# Patient Record
Sex: FEMALE | Race: WHITE | NOT HISPANIC OR LATINO | Employment: UNEMPLOYED | URBAN - METROPOLITAN AREA
[De-identification: names, ages, dates, MRNs, and addresses within clinical notes are randomized per-mention and may not be internally consistent; named-entity substitution may affect disease eponyms.]

---
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
## 2025-03-11 DIAGNOSIS — E25.0 NONCLASSIC CONGENITAL ADRENAL HYPERPLASIA DUE TO 21-HYDROXYLASE DEFICIENCY (MULTI): ICD-10-CM

## 2025-03-12 RX ORDER — SPIRONOLACTONE 100 MG/1
100 TABLET, FILM COATED ORAL 2 TIMES DAILY
Qty: 180 TABLET | Refills: 1 | Status: SHIPPED | OUTPATIENT
Start: 2025-03-12 | End: 2026-03-12

## 2025-03-12 RX ORDER — NORGESTIMATE AND ETHINYL ESTRADIOL 0.25-0.035
KIT ORAL
Qty: 112 TABLET | Refills: 3 | Status: SHIPPED | OUTPATIENT
Start: 2025-03-12

## 2025-06-06 ENCOUNTER — TELEPHONE (OUTPATIENT)
Dept: PEDIATRICS | Facility: CLINIC | Age: 20
End: 2025-06-06
Payer: COMMERCIAL

## 2025-06-06 DIAGNOSIS — Z11.1 SCREENING FOR TUBERCULOSIS: Primary | ICD-10-CM

## 2025-06-06 NOTE — TELEPHONE ENCOUNTER
Mother stated that Pt is needing a tdap test to shadow at the hospital. I wasn't sure if I should schedule since ifs been a year since she was here for a nurse visit. Mothers phone number is [phone]

## 2025-06-06 NOTE — TELEPHONE ENCOUNTER
Spoke with mother on the phone. Georgette will be shadowing at [facility] this summer. Needs a TB screen to start. Will order t-spot.

## 2025-06-09 LAB
IGNF NEG CNTRL BLD: NORMAL
M TB IFN-G BLD-IMP: NEGATIVE
MITOGEN IGNF.SPOT COUNT BLD: NORMAL
QUEST PANEL A SPOT COUNT: 1
QUEST PANEL B SPOT COUNT: 0

## 2025-06-16 ENCOUNTER — APPOINTMENT (OUTPATIENT)
Dept: PEDIATRIC ENDOCRINOLOGY | Facility: CLINIC | Age: 20
End: 2025-06-16
Payer: COMMERCIAL

## 2025-06-16 VITALS
HEIGHT: 59 IN | SYSTOLIC BLOOD PRESSURE: 105 MMHG | WEIGHT: 95.46 LBS | HEART RATE: 73 BPM | BODY MASS INDEX: 19.24 KG/M2 | DIASTOLIC BLOOD PRESSURE: 69 MMHG

## 2025-06-16 DIAGNOSIS — E25.0 NONCLASSIC CONGENITAL ADRENAL HYPERPLASIA DUE TO 21-HYDROXYLASE DEFICIENCY (MULTI): Primary | ICD-10-CM

## 2025-06-16 PROCEDURE — 3008F BODY MASS INDEX DOCD: CPT | Performed by: PEDIATRICS

## 2025-06-16 PROCEDURE — 99215 OFFICE O/P EST HI 40 MIN: CPT | Performed by: PEDIATRICS

## 2025-06-16 RX ORDER — ESTRADIOL 2 MG/1
TABLET ORAL
Qty: 21 TABLET | Refills: 3 | Status: SHIPPED | OUTPATIENT
Start: 2025-06-16

## 2025-06-16 RX ORDER — SPIRONOLACTONE 100 MG/1
100 TABLET, FILM COATED ORAL 2 TIMES DAILY
Qty: 180 TABLET | Refills: 3 | Status: SHIPPED | OUTPATIENT
Start: 2025-06-16 | End: 2026-06-16

## 2025-06-16 NOTE — PROGRESS NOTES
Subjective   Georgette Taylor is a 19 y.o. female who presents for Nonclassic congenital adrenal hyperplasia due to 21-hydroxy    Last visit was a year ago. CAH panel consistent with nonclassic CAH with adequate cortisol response. On OCP and spironolactone    Sprintec in the evening - and bhumika 100 mg BID:    Never missing a dose    Androgen control - excellent no acne or hair development.    Still some light headedness intermittently. 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     No nausaea or vomiting.     No excessive urination.    Weight is down 5 pounds.    Going to [facility]: end of July.     Spotting happening intermittently with a couple days duration.     Last spotting was a month ago. Was happening continuously even before going to college. Starting senior year. No other medications or health issues. Recent diagnosis of BV/yeast infection.      Current Outpatient Medications:   ·  cetirizine (ZyrTEC) 10 mg tablet, Take 1 tablet (10 mg) by mouth once daily., Disp: , Rfl:   ·  fluticasone (Flonase) 50 mcg/actuation nasal spray, , Disp: , Rfl:   ·  norgestimate-ethinyl estradiol (Sprintec, 28,) 0.25-35 mg-mcg tablet, Take 1 pill daily, skipping placebo pills and take continuously. Please dispense 4 packs every 3 months, allowing one withdrawal bleed every 3-6 months., Disp: 112 tablet, Rfl: 3  ·  triamcinolone (Kenalog) 0.1 % cream, Apply topically 2 times a day. IN A THIN FILM TO AFFECTED AREAS. (AM AND PM)., Disp: 80 g, Rfl: 1  ·  HYDROcodone-acetaminophen (Norco) 5-325 mg tablet, Take 1 tablet by mouth every 4 hours if needed. (Patient not taking: Reported on 6/16/2025), Disp: , Rfl:   ·  ketotifen (Zaditor) 0.025 % (0.035 %) ophthalmic solution, Administer into affected eye(s) every 12 hours. (Patient not taking: Reported on 6/16/2025), Disp: , Rfl:   ·  spironolactone (Aldactone) 100 mg tablet, Take 1 tablet (100 mg) by mouth 2 times a day., Disp: 180 tablet, Rfl:  "3            Current Outpatient Medications:   ·  cetirizine (ZyrTEC) 10 mg tablet, Take 1 tablet (10 mg) by mouth once daily., Disp: , Rfl:   ·  fluticasone (Flonase) 50 mcg/actuation nasal spray, , Disp: , Rfl:   ·  norgestimate-ethinyl estradiol (Sprintec, 28,) 0.25-35 mg-mcg tablet, Take 1 pill daily, skipping placebo pills and take continuously. Please dispense 4 packs every 3 months, allowing one withdrawal bleed every 3-6 months., Disp: 112 tablet, Rfl: 3  ·  spironolactone (Aldactone) 100 mg tablet, Take 1 tablet (100 mg) by mouth 2 times a day., Disp: 180 tablet, Rfl: 1  ·  triamcinolone (Kenalog) 0.1 % cream, Apply topically 2 times a day. IN A THIN FILM TO AFFECTED AREAS. (AM AND PM)., Disp: 80 g, Rfl: 1  ·  HYDROcodone-acetaminophen (Norco) 5-325 mg tablet, Take 1 tablet by mouth every 4 hours if needed. (Patient not taking: Reported on 6/16/2025), Disp: , Rfl:   ·  ketotifen (Zaditor) 0.025 % (0.035 %) ophthalmic solution, Administer into affected eye(s) every 12 hours. (Patient not taking: Reported on 6/16/2025), Disp: , Rfl:           Review of Systems   All other systems reviewed and are negative.       Objective   /69 (BP Location: Right arm, Patient Position: Sitting)   Pulse 73   Ht 1.494 m (4' 10.82\")   Wt (!) 43.3 kg (95 lb 7.4 oz)   BMI 19.40 kg/m²   Growth Velocity: 0.161 cm/yr using Stature 1.494 m recorded 6/16/2025 and Stature 1.492 m recorded 3/19/2024    Physical Exam  Constitutional:       Appearance: Normal appearance.   HENT:      Head: Normocephalic and atraumatic.      Nose: Nose normal.      Mouth/Throat:      Mouth: Mucous membranes are moist.      Pharynx: Oropharynx is clear.   Eyes:      Extraocular Movements: Extraocular movements intact.      Conjunctiva/sclera: Conjunctivae normal.   Cardiovascular:      Rate and Rhythm: Normal rate and regular rhythm.      Pulses: Normal pulses.      Heart sounds: Normal heart sounds.   Pulmonary:      Effort: Pulmonary effort is " normal.      Breath sounds: Normal breath sounds.   Abdominal:      General: Abdomen is flat. Bowel sounds are normal.      Palpations: Abdomen is soft.   Musculoskeletal:         General: Normal range of motion.      Cervical back: Normal range of motion and neck supple.   Skin:     General: Skin is warm and dry.   Neurological:      General: No focal deficit present.      Mental Status: She is alert. Mental status is at baseline.   Psychiatric:         Mood and Affect: Mood normal.         Behavior: Behavior normal.         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 No prior pelvic imaging obtained, will recommend a baseline pelvic US to rule out endometrial hyperplasia. However, she has allowed cycles to occur a couple times a year so this would be somewhat surprising. Assuming US is reassuring, would reocmmend a trial of oral estrace x 7 days up to once per month if breakthrough bleeding occurs. Due for labs (ordered today). Meds refilled. FU in 1 year. Suggested Dr. Phyllis Gleason (med peds) as a future transition option and non urgent consideration of referral to reproductive endocrinology.

## 2025-06-16 NOTE — PATIENT INSTRUCTIONS
Recommendations:    Refilled spironolactone and Sprintec. Sprintec already has an adequate amount of estrogen.   For breakthrough bleeding, can use estrace 2 mg daily x 7 days for breakthrough bleeding once per month (prescribed today). Only use if bleeding is occurring.  Recommend a PELVIC ULTRASOUND to rule out other causes of bleeding.  Dr. Phyllis Gleason for med-peds endocrinology in the future (I'm happy to see you for another couple years).  Non-urgent appointment with reproductive endocrinology to discuss fertility options in the future. I will place this referral today.  Labs to be done in the morning optimally.  Follow-up 1 year.

## 2025-06-26 ENCOUNTER — APPOINTMENT (OUTPATIENT)
Dept: RADIOLOGY | Facility: HOSPITAL | Age: 20
End: 2025-06-26
Payer: COMMERCIAL

## 2025-06-26 DIAGNOSIS — E25.0 NONCLASSIC CONGENITAL ADRENAL HYPERPLASIA DUE TO 21-HYDROXYLASE DEFICIENCY (MULTI): ICD-10-CM

## 2025-06-26 PROCEDURE — 76830 TRANSVAGINAL US NON-OB: CPT

## 2025-07-08 DIAGNOSIS — N84.0 ENDOMETRIAL POLYP: Primary | ICD-10-CM

## 2025-07-08 DIAGNOSIS — E25.0 NONCLASSIC CONGENITAL ADRENAL HYPERPLASIA DUE TO 21-HYDROXYLASE DEFICIENCY (MULTI): ICD-10-CM
